# Patient Record
Sex: FEMALE | Race: BLACK OR AFRICAN AMERICAN | NOT HISPANIC OR LATINO | Employment: FULL TIME | URBAN - METROPOLITAN AREA
[De-identification: names, ages, dates, MRNs, and addresses within clinical notes are randomized per-mention and may not be internally consistent; named-entity substitution may affect disease eponyms.]

---

## 2018-08-22 PROBLEM — I10 ESSENTIAL HYPERTENSION, BENIGN: Status: ACTIVE | Noted: 2018-08-22

## 2018-08-22 PROBLEM — Z11.3 ROUTINE SCREENING FOR STI (SEXUALLY TRANSMITTED INFECTION): Status: ACTIVE | Noted: 2018-08-22

## 2018-08-22 PROBLEM — D50.8 IRON DEFICIENCY ANEMIA SECONDARY TO INADEQUATE DIETARY IRON INTAKE: Status: ACTIVE | Noted: 2018-08-22

## 2018-08-22 PROBLEM — E11.00 TYPE 2 DIABETES MELLITUS WITH HYPEROSMOLARITY WITHOUT COMA, WITHOUT LONG-TERM CURRENT USE OF INSULIN: Status: ACTIVE | Noted: 2018-08-22

## 2018-08-22 PROBLEM — Z00.00 ROUTINE GENERAL MEDICAL EXAMINATION AT A HEALTH CARE FACILITY: Status: ACTIVE | Noted: 2018-08-22

## 2018-08-31 PROBLEM — M85.88 OSTEOPENIA OF LUMBAR SPINE: Status: ACTIVE | Noted: 2018-08-31

## 2018-09-12 PROBLEM — I51.7 CARDIOMEGALY: Status: ACTIVE | Noted: 2018-09-12

## 2018-09-12 PROBLEM — G89.29 CHRONIC PAIN OF RIGHT ANKLE: Status: ACTIVE | Noted: 2018-09-12

## 2018-09-12 PROBLEM — M25.571 CHRONIC PAIN OF RIGHT ANKLE: Status: ACTIVE | Noted: 2018-09-12

## 2018-09-12 PROBLEM — E78.5 HYPERLIPIDEMIA: Status: ACTIVE | Noted: 2018-09-12

## 2018-10-08 PROBLEM — E86.0 DEHYDRATION, MODERATE: Status: ACTIVE | Noted: 2018-10-08

## 2018-10-08 PROBLEM — K52.9 GASTROENTERITIS: Status: ACTIVE | Noted: 2018-10-08

## 2018-11-26 PROBLEM — Z00.00 ROUTINE GENERAL MEDICAL EXAMINATION AT A HEALTH CARE FACILITY: Status: RESOLVED | Noted: 2018-08-22 | Resolved: 2018-11-26

## 2019-02-25 PROBLEM — E55.9 VITAMIN D DEFICIENCY: Status: ACTIVE | Noted: 2019-02-25

## 2019-02-25 PROBLEM — E11.9 DIABETES MELLITUS, TYPE 2: Status: RESOLVED | Noted: 2019-02-25 | Resolved: 2019-02-25

## 2019-02-25 PROBLEM — M25.561 ACUTE PAIN OF RIGHT KNEE: Status: ACTIVE | Noted: 2019-02-25

## 2019-02-25 PROBLEM — E88.810 METABOLIC SYNDROME: Status: ACTIVE | Noted: 2019-02-25

## 2019-02-25 PROBLEM — E11.00 TYPE 2 DIABETES MELLITUS WITH HYPEROSMOLARITY WITHOUT COMA, WITHOUT LONG-TERM CURRENT USE OF INSULIN: Status: RESOLVED | Noted: 2018-08-22 | Resolved: 2019-02-25

## 2019-06-03 PROBLEM — R01.1 HEART MURMUR PREVIOUSLY UNDIAGNOSED: Status: ACTIVE | Noted: 2019-06-03

## 2019-06-03 PROBLEM — R51.9 NONINTRACTABLE EPISODIC HEADACHE: Status: ACTIVE | Noted: 2019-06-03

## 2019-06-03 PROBLEM — I10 HYPERTENSION: Status: ACTIVE | Noted: 2019-06-03

## 2019-06-05 PROBLEM — M77.31 HEEL SPUR, RIGHT: Status: ACTIVE | Noted: 2019-06-05

## 2019-06-05 PROBLEM — M76.61 ACHILLES TENDINITIS OF RIGHT LOWER EXTREMITY: Status: ACTIVE | Noted: 2019-06-05

## 2019-06-05 PROBLEM — M17.11 PRIMARY OSTEOARTHRITIS OF RIGHT KNEE: Status: ACTIVE | Noted: 2019-06-05

## 2019-06-13 ENCOUNTER — TELEPHONE (OUTPATIENT)
Dept: RADIOLOGY | Facility: HOSPITAL | Age: 49
End: 2019-06-13

## 2019-06-20 ENCOUNTER — HOSPITAL ENCOUNTER (OUTPATIENT)
Dept: RADIOLOGY | Facility: HOSPITAL | Age: 49
Discharge: HOME | End: 2019-06-20
Attending: INTERNAL MEDICINE
Payer: COMMERCIAL

## 2019-06-20 DIAGNOSIS — R51.9 NONINTRACTABLE EPISODIC HEADACHE, UNSPECIFIED HEADACHE TYPE: ICD-10-CM

## 2019-06-20 PROCEDURE — 70450 CT HEAD WITHOUT CONTRAST: ICD-10-PCS | Mod: 26,,, | Performed by: RADIOLOGY

## 2019-06-20 PROCEDURE — 70450 CT HEAD/BRAIN W/O DYE: CPT | Mod: TC

## 2019-06-20 PROCEDURE — 70450 CT HEAD/BRAIN W/O DYE: CPT | Mod: 26,,, | Performed by: RADIOLOGY

## 2020-02-14 PROBLEM — K52.9 GASTROENTERITIS: Status: RESOLVED | Noted: 2018-10-08 | Resolved: 2020-02-14

## 2020-02-14 PROBLEM — M25.561 ACUTE PAIN OF RIGHT KNEE: Status: RESOLVED | Noted: 2019-02-25 | Resolved: 2020-02-14

## 2020-02-14 PROBLEM — M76.61 ACHILLES TENDINITIS OF RIGHT LOWER EXTREMITY: Status: RESOLVED | Noted: 2019-06-05 | Resolved: 2020-02-14

## 2020-02-14 PROBLEM — I10 HYPERTENSION: Status: RESOLVED | Noted: 2019-06-03 | Resolved: 2020-02-14

## 2020-02-14 PROBLEM — I51.7 CARDIOMEGALY: Status: RESOLVED | Noted: 2018-09-12 | Resolved: 2020-02-14

## 2020-02-14 PROBLEM — E86.0 DEHYDRATION, MODERATE: Status: RESOLVED | Noted: 2018-10-08 | Resolved: 2020-02-14

## 2020-02-14 PROBLEM — R01.1 HEART MURMUR PREVIOUSLY UNDIAGNOSED: Status: RESOLVED | Noted: 2019-06-03 | Resolved: 2020-02-14

## 2020-02-14 PROBLEM — R51.9 NONINTRACTABLE EPISODIC HEADACHE: Status: RESOLVED | Noted: 2019-06-03 | Resolved: 2020-02-14

## 2020-02-14 PROBLEM — Z11.3 ROUTINE SCREENING FOR STI (SEXUALLY TRANSMITTED INFECTION): Status: RESOLVED | Noted: 2018-08-22 | Resolved: 2020-02-14

## 2020-06-11 PROBLEM — R80.9 PROTEINURIA: Status: ACTIVE | Noted: 2020-06-11

## 2023-09-15 ENCOUNTER — OFFICE VISIT (OUTPATIENT)
Dept: PODIATRY | Facility: CLINIC | Age: 53
End: 2023-09-15
Payer: COMMERCIAL

## 2023-09-15 VITALS — WEIGHT: 195 LBS | HEIGHT: 65 IN | BODY MASS INDEX: 32.49 KG/M2

## 2023-09-15 DIAGNOSIS — L60.3 ONYCHODYSTROPHY: Primary | ICD-10-CM

## 2023-09-15 PROCEDURE — 4010F ACE/ARB THERAPY RXD/TAKEN: CPT | Mod: CPTII,S$GLB,, | Performed by: PODIATRIST

## 2023-09-15 PROCEDURE — 1160F PR REVIEW ALL MEDS BY PRESCRIBER/CLIN PHARMACIST DOCUMENTED: ICD-10-PCS | Mod: CPTII,S$GLB,, | Performed by: PODIATRIST

## 2023-09-15 PROCEDURE — 99999 PR PBB SHADOW E&M-EST. PATIENT-LVL III: ICD-10-PCS | Mod: PBBFAC,,, | Performed by: PODIATRIST

## 2023-09-15 PROCEDURE — 99999 PR PBB SHADOW E&M-EST. PATIENT-LVL III: CPT | Mod: PBBFAC,,, | Performed by: PODIATRIST

## 2023-09-15 PROCEDURE — 11730 NAIL REMOVAL: ICD-10-PCS | Mod: TA,S$GLB,, | Performed by: PODIATRIST

## 2023-09-15 PROCEDURE — 3008F PR BODY MASS INDEX (BMI) DOCUMENTED: ICD-10-PCS | Mod: CPTII,S$GLB,, | Performed by: PODIATRIST

## 2023-09-15 PROCEDURE — 3008F BODY MASS INDEX DOCD: CPT | Mod: CPTII,S$GLB,, | Performed by: PODIATRIST

## 2023-09-15 PROCEDURE — 1159F PR MEDICATION LIST DOCUMENTED IN MEDICAL RECORD: ICD-10-PCS | Mod: CPTII,S$GLB,, | Performed by: PODIATRIST

## 2023-09-15 PROCEDURE — 99203 OFFICE O/P NEW LOW 30 MIN: CPT | Mod: 25,S$GLB,, | Performed by: PODIATRIST

## 2023-09-15 PROCEDURE — 99203 PR OFFICE/OUTPT VISIT, NEW, LEVL III, 30-44 MIN: ICD-10-PCS | Mod: 25,S$GLB,, | Performed by: PODIATRIST

## 2023-09-15 PROCEDURE — 1159F MED LIST DOCD IN RCRD: CPT | Mod: CPTII,S$GLB,, | Performed by: PODIATRIST

## 2023-09-15 PROCEDURE — 1160F RVW MEDS BY RX/DR IN RCRD: CPT | Mod: CPTII,S$GLB,, | Performed by: PODIATRIST

## 2023-09-15 PROCEDURE — 4010F PR ACE/ARB THEARPY RXD/TAKEN: ICD-10-PCS | Mod: CPTII,S$GLB,, | Performed by: PODIATRIST

## 2023-09-15 PROCEDURE — 11730 AVULSION NAIL PLATE SIMPLE 1: CPT | Mod: TA,S$GLB,, | Performed by: PODIATRIST

## 2023-09-15 NOTE — PROCEDURES
Nail Removal    Date/Time: 9/15/2023 11:15 AM    Performed by: Jagruti Huerta DPM  Authorized by: Jagruti Huerta DPM    Consent Done?:  Yes (Written)  Time out: Immediately prior to the procedure a time out was called    Prep: patient was prepped and draped in usual sterile fashion    Location:     Location:  Left foot    Location detail:  Left big toe  Anesthesia:     Anesthesia:  Digital block    Local anesthetic:  Lidocaine 1% without epinephrine (0.75% Marcaine plain)    Anesthetic total (ml):  3  Procedure Details:     Preparation:  Skin prepped with alcohol and skin prepped with Betadine    Amount removed:  Complete    Wedge excision of skin of nail fold: No      Nail bed sutured?: No      Nail matrix removed:  None    Dressing applied:  4x4, antibiotic ointment and dressing applied    Patient tolerance:  Patient tolerated the procedure well with no immediate complications

## 2023-09-15 NOTE — PROGRESS NOTES
Subjective:       Patient ID: Gene Garcia is a 53 y.o. female.    Chief Complaint: Ingrown Toenail (Left great toe is black and loose. Diabetic wearing closed toe shoes and socks, pain 0/10, Last seen by PCP Chasity Denis MD 08/12/22)      HPI: Gene Garcia presents to the elevation and loosening of the left great toe.  States noticing discoloration and thickening to the toenail.  She relates 0/10 pain. Patient's Primary Care Provider is Chasity Denis MD.     Review of patient's allergies indicates:   Allergen Reactions    Codeine Hallucinations       Past Medical History:   Diagnosis Date    Anemia     Essential hypertension 6/8/2017 3:22:19 PM    [facility] Historical - Cardiovascular: Hypertension-No Additional Notes    Family history of diabetes mellitus 6/11/2012 9:50:36 AM    [facility] - [facility]: Family history of diabetes mellitus-No Additional Notes    Fatigue     Hypertension     Insulin resistance     Irregular menstrual cycle 4/27/2012 2:35:53 PM    [facility]: Bleeding, Irregular Menses-No Additional Notes    Other malaise and fatigue 6/11/2012 9:14:48 AM    [facility] - Endocrine/Metabolic/Immune: Fatigue/malaise-No Additional Notes    Pure hypercholesterolemia 9/5/2018 2:56:19 PM    [facility] - Endocrine/Metabolic/Immune: Hypercholesterolemia-No Additional Notes    Vitamin D deficiency 4/19/2013 2:42:50 PM    [facility] - Do Not Use: Vitamin D deficiency-No Additional Notes       Family History   Problem Relation Age of Onset    Cancer Mother     COPD Mother         lung    Diabetes Brother     Heart disease Brother     Hypertension Neg Hx     Stroke Neg Hx        Social History     Socioeconomic History    Marital status:    Tobacco Use    Smoking status: Never     Passive exposure: Never    Smokeless tobacco: Never   Substance and Sexual Activity    Alcohol use: No    Drug use: Never    Sexual  "activity: Yes     Partners: Male     Birth control/protection: Post-menopausal       Past Surgical History:   Procedure Laterality Date    VAGINAL DELIVERY      x 2        Review of Systems      Objective:   Ht 5' 5" (1.651 m)   Wt 88.5 kg (195 lb)   BMI 32.45 kg/m²     Physical Exam  LOWER EXTREMITY PHYSICAL EXAMINATION    NEUROLOGY: Sensation to light touch is intact. Proprioception is intact.     VASCULAR: On the left foot, the dorsalis pedis pulse is 2/4 and the posterior tibial pulse is 2/4. Capillary refill time is less than 3 seconds. Hair growth is present on the dorsum of the foot and at the digits. Proximal to distal temperature is warm to warm.    DERMATOLOGY:  Elevation of the left nail plate from the underlying nail bed.  There is no signs of acute infection.  The nail is painted but does show some signs of darkening.  There is no acute signs  Of infection.    ORTHOPEDIC: Manual Muscle Testing is 5/5 in all planes on the left, without pains, with and without resistance. Gait pattern is slightly antalgic.    Assessment:     1. Onychodystrophy        Plan:     Onychodystrophy      We discussed patient's options for treating the i dystrophic toenail.  We discussed temporary complete avulsion, and attempted permanent matricectomy.  Patient would like to proceed with temporary avulsion.  Discussed the risk and benefits of the procedure.  Discussed risk of recurrence.  Patient did give written consent to proceed with procedure.    Patient tolerated procedure well without complications.  Hallux nail was removed without complications.  History patient will start soaking in warm water and Epson salt twice daily.  Apply antibiotic cream and a Band-Aid to the affected borders following soaking and showering.  Patient will call if there is any acute signs of infection associated with increased redness, swelling, abnormal drainage, increased pain.        Future Appointments   Date Time Provider Department Center "   11/27/2023  9:40 AM LW  [facility] SCREEN [facility]   11/27/2023 10:40 AM Nely Shelley MD [facility]

## 2023-09-15 NOTE — LETTER
September 15, 2023      The [facility] Podiatry 2nd Floor  [address]  [place] [address]  Phone: [phone]  Fax: [phone]       Patient: Gene Garcia   YOB: 1970  Date of Visit: 09/15/2023    To Whom It May Concern:    Dannielle Garcia  was at [facility] on 09/15/2023. The patient may return to work/school on 09/15/23 with no restrictions. If you have any questions or concerns, or if I can be of further assistance, please do not hesitate to contact me.    Sincerely,    Eilsha Deleon MA

## 2023-09-20 ENCOUNTER — TELEPHONE (OUTPATIENT)
Dept: PODIATRY | Facility: CLINIC | Age: 53
End: 2023-09-20
Payer: COMMERCIAL

## 2023-09-20 NOTE — TELEPHONE ENCOUNTER
Pt notified states she has a thin layer of toenail on toe after last office visit of toenail removal and wants to know if this is normal or do she needs to schedule an follow up appointment. Message sent to Dr. Huerta for his recommendations. Pt verbalized understanding.

## 2023-09-20 NOTE — TELEPHONE ENCOUNTER
----- Message from Kinza Camacho sent at 9/20/2023  4:36 PM CDT -----  Patient is requesting a call back concerning the call she missed. Call back at [phone]

## 2023-09-20 NOTE — TELEPHONE ENCOUNTER
Called patient twice today to see what we could do for her but  no call  back ....vc----- Message from Reina Bearden sent at 9/20/2023 11:47 AM CDT -----  Contact: Gene  Type:  Needs Medical Advice    Who Called: Gene  Symptoms (please be specific): Toenail    How long has patient had these symptoms:  Not provided  Pharmacy name and phone #:    Not provided  Would the patient rather a call back or a response via [facility]? call  Best Call Back Number: [phone]   Additional Information: Patient reports after toenail removal there is a layer that is not totally removed and request to confirm if it will be removed through epsom salt soak or is a re-visit necessary.   Thank you,  GH

## 2023-10-13 ENCOUNTER — OFFICE VISIT (OUTPATIENT)
Dept: PODIATRY | Facility: CLINIC | Age: 53
End: 2023-10-13
Payer: COMMERCIAL

## 2023-10-13 VITALS — WEIGHT: 195 LBS | BODY MASS INDEX: 32.49 KG/M2 | HEIGHT: 65 IN

## 2023-10-13 DIAGNOSIS — Z87.828: Primary | ICD-10-CM

## 2023-10-13 DIAGNOSIS — L60.3 ONYCHODYSTROPHY: ICD-10-CM

## 2023-10-13 PROCEDURE — 99213 PR OFFICE/OUTPT VISIT, EST, LEVL III, 20-29 MIN: ICD-10-PCS | Mod: S$GLB,,, | Performed by: PODIATRIST

## 2023-10-13 PROCEDURE — 3008F PR BODY MASS INDEX (BMI) DOCUMENTED: ICD-10-PCS | Mod: CPTII,S$GLB,, | Performed by: PODIATRIST

## 2023-10-13 PROCEDURE — 1160F RVW MEDS BY RX/DR IN RCRD: CPT | Mod: CPTII,S$GLB,, | Performed by: PODIATRIST

## 2023-10-13 PROCEDURE — 1160F PR REVIEW ALL MEDS BY PRESCRIBER/CLIN PHARMACIST DOCUMENTED: ICD-10-PCS | Mod: CPTII,S$GLB,, | Performed by: PODIATRIST

## 2023-10-13 PROCEDURE — 4010F PR ACE/ARB THEARPY RXD/TAKEN: ICD-10-PCS | Mod: CPTII,S$GLB,, | Performed by: PODIATRIST

## 2023-10-13 PROCEDURE — 3008F BODY MASS INDEX DOCD: CPT | Mod: CPTII,S$GLB,, | Performed by: PODIATRIST

## 2023-10-13 PROCEDURE — 99213 OFFICE O/P EST LOW 20 MIN: CPT | Mod: S$GLB,,, | Performed by: PODIATRIST

## 2023-10-13 PROCEDURE — 1159F PR MEDICATION LIST DOCUMENTED IN MEDICAL RECORD: ICD-10-PCS | Mod: CPTII,S$GLB,, | Performed by: PODIATRIST

## 2023-10-13 PROCEDURE — 99999 PR PBB SHADOW E&M-EST. PATIENT-LVL III: ICD-10-PCS | Mod: PBBFAC,,, | Performed by: PODIATRIST

## 2023-10-13 PROCEDURE — 4010F ACE/ARB THERAPY RXD/TAKEN: CPT | Mod: CPTII,S$GLB,, | Performed by: PODIATRIST

## 2023-10-13 PROCEDURE — 1159F MED LIST DOCD IN RCRD: CPT | Mod: CPTII,S$GLB,, | Performed by: PODIATRIST

## 2023-10-13 PROCEDURE — 99999 PR PBB SHADOW E&M-EST. PATIENT-LVL III: CPT | Mod: PBBFAC,,, | Performed by: PODIATRIST

## 2023-10-13 NOTE — PROGRESS NOTES
Subjective:       Patient ID: Gene Garcia is a 53 y.o. female.    Chief Complaint: Nail Problem (C/o partial nail left after procedure. Pain 0/10 Nondiabetic wearing sneakers, last seen 9/15/23)      HPI: Gene Garcia presents to the [facility]  left hallux nail plate status post nail avulsion.  States 0/10 pain.  Does notice some thickening of the skin and she is convinced that this may be associated with the nail.Patient's Primary Care Provider is Chasity Denis MD.     Review of patient's allergies indicates:   Allergen Reactions    Codeine Hallucinations       Past Medical History:   Diagnosis Date    Anemia     Essential hypertension 6/8/2017 3:22:19 PM    [facility] Historical - Cardiovascular: Hypertension-No Additional Notes    Family history of diabetes mellitus 6/11/2012 9:50:36 AM    [facility] - [facility]: Family history of diabetes mellitus-No Additional Notes    Fatigue     Hypertension     Insulin resistance     Irregular menstrual cycle 4/27/2012 2:35:53 PM    [facility] - HA: Bleeding, Irregular Menses-No Additional Notes    Other malaise and fatigue 6/11/2012 9:14:48 AM    [facility] - Endocrine/Metabolic/Immune: Fatigue/malaise-No Additional Notes    Pure hypercholesterolemia 9/5/2018 2:56:19 PM    [facility] - Endocrine/Metabolic/Immune: Hypercholesterolemia-No Additional Notes    Vitamin D deficiency 4/19/2013 2:42:50 PM    [facility] - Do Not Use: Vitamin D deficiency-No Additional Notes       Family History   Problem Relation Age of Onset    Cancer Mother     COPD Mother         lung    Diabetes Brother     Heart disease Brother     Hypertension Neg Hx     Stroke Neg Hx        Social History     Socioeconomic History    Marital status:    Tobacco Use    Smoking status: Never     Passive exposure: Never    Smokeless tobacco: Never   Substance and Sexual Activity    Alcohol use: No    Drug use: Never     "Sexual activity: Yes     Partners: Male     Birth control/protection: Post-menopausal       Past Surgical History:   Procedure Laterality Date    VAGINAL DELIVERY      x 2        Review of Systems      Objective:   Ht 5' 5" (1.651 m)   Wt 88.5 kg (195 lb)   BMI 32.45 kg/m²     Physical Exam  LOWER EXTREMITY PHYSICAL EXAMINATION    NEUROLOGY: Sensation to light touch is intact. Proprioception is intact.     VASCULAR: On the left foot, the dorsalis pedis pulse is 2/4 and the posterior tibial pulse is 2/4. Capillary refill time is less than 3 seconds. Hair growth is present on the dorsum of the foot and at the digits. Proximal to distal temperature is warm to warm.    DERMATOLOGY:    No evidence of nail is noted.  Slight thickening of the skin is noted to the nail bed.  No acute signs of infection    ORTHOPEDIC: Manual Muscle Testing is 5/5 in all planes on the left, without pains, with and without resistance. Gait pattern is slightly antalgic.    Assessment:     1. History of avulsion    2. Onychodystrophy        Plan:     History of avulsion    Onychodystrophy       Foot counseling and education is provided at this visit. Patient is advised to wear socks and shoes at all times.  Do not walk barefoot, or with just socks, even when indoors.  Be sure to check and inspect the inside of the shoe before putting them on her feet.  Protect your feet at all times.  Walking shoes and/or athletic shoes are the best types of shoe gear. Do not wear vinyl or plastic type shoe gear, as they do not stretch and/or breathe.  Protect your feet from hot and/or cold. Elevate the extremities when sitting.  Do not wear excessively tight socks and/or shoe gear. Wiggle your toes for a few minutes throughout the day. Move your ankles up and down, in and out, to help blood flow in your lower extremity.      No evidence of residual toenail.      Follow-up p.r.n. for new or worsening pathology.        Future Appointments   Date Time Provider " Department Center   11/27/2023  9:40 AM LW  [facility] SCREEN [facility]   11/27/2023 10:40 AM Nely Shelley MD [facility]

## 2023-10-13 NOTE — LETTER
October 13, 2023      The [facility] Podiatry 2nd Floor  [address]  [place] [address]  Phone: [phone]  Fax: [phone]       Patient: Gene Garcia   YOB: 1970  Date of Visit: 10/13/2023    To Whom It May Concern:    Dannielle Garcia  was at [facility] on 10/13/2023. The patient may return to work/school on 10/16/23 with no restrictions. If you have any questions or concerns, or if I can be of further assistance, please do not hesitate to contact me.    Sincerely,    Benjamín Victor LPN